# Patient Record
Sex: FEMALE | Race: ASIAN | NOT HISPANIC OR LATINO | URBAN - METROPOLITAN AREA
[De-identification: names, ages, dates, MRNs, and addresses within clinical notes are randomized per-mention and may not be internally consistent; named-entity substitution may affect disease eponyms.]

---

## 2020-02-04 ENCOUNTER — EMERGENCY (EMERGENCY)
Facility: HOSPITAL | Age: 45
LOS: 1 days | Discharge: HOME | End: 2020-02-04
Attending: EMERGENCY MEDICINE | Admitting: EMERGENCY MEDICINE
Payer: MEDICAID

## 2020-02-04 VITALS
SYSTOLIC BLOOD PRESSURE: 127 MMHG | TEMPERATURE: 98 F | DIASTOLIC BLOOD PRESSURE: 77 MMHG | RESPIRATION RATE: 18 BRPM | HEART RATE: 82 BPM | OXYGEN SATURATION: 100 %

## 2020-02-04 PROCEDURE — 99283 EMERGENCY DEPT VISIT LOW MDM: CPT

## 2020-02-04 NOTE — ED PROVIDER NOTE - OBJECTIVE STATEMENT
45 y/o F with PMHx of diabetes (presently taking Metformin) presents to ED with lipoma for 6 years. Pt states that lipoma initially developed 6 years ago and at this time had MRI performed and was told that it was a lipoma. Presently pt in ED for concern of worsening pain. Pain worsened with movement of Rt arm. Denies having any fever or other medical complaints.

## 2020-02-04 NOTE — ED PROVIDER NOTE - CLINICAL SUMMARY MEDICAL DECISION MAKING FREE TEXT BOX
43 y/o F presents to ED with lipoma for 6 years, exam consistent with lipoma. No signs of infection. Offered oral pain medications, but patient declines. Will refer to outpatient surgery for further evaluation.

## 2020-02-04 NOTE — ED PROVIDER NOTE - PATIENT PORTAL LINK FT
You can access the FollowMyHealth Patient Portal offered by [facility] by registering at the following website: [email or web address]. By joining [employer]s FollowMyHealth portal, you will also be able to view your health information using other applications (apps) compatible with our system.

## 2020-02-04 NOTE — ED PROVIDER NOTE - TEMPLATE, MLM
04/09/19                            Maribel Salcedorene  [id number] [place] [address]    To Whom It May Concern:    This is to certify Maribel Santiago was evaluated with Melo Foreman DO on 04/09/19 and can return to regular physical education on 04/10/19.               Melo Foreman DO  [facility] [place] [address]  [address] [address]  Phone: [phone]     General

## 2022-07-14 PROBLEM — E11.9 TYPE 2 DIABETES MELLITUS WITHOUT COMPLICATIONS: Chronic | Status: ACTIVE | Noted: 2020-02-06

## 2022-07-25 PROBLEM — Z00.00 ENCOUNTER FOR PREVENTIVE HEALTH EXAMINATION: Status: ACTIVE | Noted: 2022-07-25

## 2022-07-27 ENCOUNTER — APPOINTMENT (OUTPATIENT)
Dept: MRI IMAGING | Facility: CLINIC | Age: 47
End: 2022-07-27